# Patient Record
Sex: FEMALE | URBAN - METROPOLITAN AREA
[De-identification: names, ages, dates, MRNs, and addresses within clinical notes are randomized per-mention and may not be internally consistent; named-entity substitution may affect disease eponyms.]

---

## 2020-10-25 ENCOUNTER — AMBULATORY - RIVER FALLS (OUTPATIENT)
Dept: FAMILY MEDICINE | Facility: CLINIC | Age: 55
End: 2020-10-25

## 2022-02-15 NOTE — TELEPHONE ENCOUNTER
---------------------  From: Rosalina Gong   To: Malou HESTER, Louie;     Sent: 10/22/2020 10:52:43 AM CDT  Subject: Scheduling Management     Patient has a home sleep study uploaded into [employer] ready for interpretation